# Patient Record
Sex: MALE | Employment: OTHER | URBAN - METROPOLITAN AREA
[De-identification: names, ages, dates, MRNs, and addresses within clinical notes are randomized per-mention and may not be internally consistent; named-entity substitution may affect disease eponyms.]

---

## 2022-02-21 ENCOUNTER — TELEPHONE ENCOUNTER (OUTPATIENT)
Dept: URBAN - METROPOLITAN AREA CLINIC 9 | Facility: CLINIC | Age: 72
End: 2022-02-21

## 2022-02-21 ENCOUNTER — OFFICE VISIT (OUTPATIENT)
Dept: URBAN - METROPOLITAN AREA CLINIC 7 | Facility: CLINIC | Age: 72
End: 2022-02-21

## 2022-02-23 ENCOUNTER — OFFICE VISIT (OUTPATIENT)
Dept: URBAN - METROPOLITAN AREA SURGERY CENTER 5 | Facility: SURGERY CENTER | Age: 72
End: 2022-02-23

## 2022-02-23 ENCOUNTER — TELEPHONE ENCOUNTER (OUTPATIENT)
Dept: URBAN - METROPOLITAN AREA CLINIC 9 | Facility: CLINIC | Age: 72
End: 2022-02-23

## 2022-03-02 ENCOUNTER — OFFICE VISIT (OUTPATIENT)
Dept: URBAN - METROPOLITAN AREA CLINIC 7 | Facility: CLINIC | Age: 72
End: 2022-03-02

## 2022-03-09 ENCOUNTER — OFFICE VISIT (OUTPATIENT)
Dept: URBAN - METROPOLITAN AREA CLINIC 7 | Facility: CLINIC | Age: 72
End: 2022-03-09

## 2022-04-18 ENCOUNTER — TELEPHONE ENCOUNTER (OUTPATIENT)
Dept: URBAN - METROPOLITAN AREA CLINIC 9 | Facility: CLINIC | Age: 72
End: 2022-04-18

## 2022-06-17 ENCOUNTER — TELEPHONE ENCOUNTER (OUTPATIENT)
Dept: URBAN - METROPOLITAN AREA CLINIC 9 | Facility: CLINIC | Age: 72
End: 2022-06-17

## 2022-06-27 ENCOUNTER — OFFICE VISIT (OUTPATIENT)
Dept: URBAN - METROPOLITAN AREA SURGERY CENTER 5 | Facility: SURGERY CENTER | Age: 72
End: 2022-06-27

## 2022-06-27 ENCOUNTER — TELEPHONE ENCOUNTER (OUTPATIENT)
Dept: URBAN - METROPOLITAN AREA CLINIC 9 | Facility: CLINIC | Age: 72
End: 2022-06-27

## 2022-07-30 ENCOUNTER — TELEPHONE ENCOUNTER (OUTPATIENT)
Age: 72
End: 2022-07-30

## 2022-07-31 ENCOUNTER — TELEPHONE ENCOUNTER (OUTPATIENT)
Age: 72
End: 2022-07-31

## 2022-08-18 ENCOUNTER — WEB ENCOUNTER (OUTPATIENT)
Dept: URBAN - METROPOLITAN AREA CLINIC 7 | Facility: CLINIC | Age: 72
End: 2022-08-18

## 2022-08-18 ENCOUNTER — OFFICE VISIT (OUTPATIENT)
Dept: URBAN - METROPOLITAN AREA CLINIC 7 | Facility: CLINIC | Age: 72
End: 2022-08-18
Payer: MEDICARE

## 2022-08-18 VITALS
WEIGHT: 133 LBS | BODY MASS INDEX: 19.04 KG/M2 | HEIGHT: 70 IN | DIASTOLIC BLOOD PRESSURE: 70 MMHG | SYSTOLIC BLOOD PRESSURE: 120 MMHG | TEMPERATURE: 98 F

## 2022-08-18 DIAGNOSIS — K22.70 BARRETT'S ESOPHAGUS WITHOUT DYSPLASIA: ICD-10-CM

## 2022-08-18 DIAGNOSIS — R13.12 OROPHARYNGEAL DYSPHAGIA: ICD-10-CM

## 2022-08-18 PROBLEM — 71457002: Status: ACTIVE | Noted: 2022-08-17

## 2022-08-18 PROCEDURE — 99214 OFFICE O/P EST MOD 30 MIN: CPT | Performed by: INTERNAL MEDICINE

## 2022-08-18 RX ORDER — UMECLIDINIUM BROMIDE AND VILANTEROL TRIFENATATE 62.5; 25 UG/1; UG/1
INHALE 1 PUFF BY MOUTH ONCE DAILY POWDER RESPIRATORY (INHALATION)
Qty: 60 EACH | Refills: 0 | Status: ACTIVE | COMMUNITY

## 2022-08-18 RX ORDER — AMOXICILLIN 500 MG/1
TAKE 1 TABLET BY MOUTH THREE TIMES DAILY UNTIL GONE TABLET, FILM COATED ORAL
Qty: 21 EACH | Refills: 0 | Status: ON HOLD | COMMUNITY

## 2022-08-18 RX ORDER — IBUPROFEN 800 MG/1
TAKE 1 TABLET BY MOUTH EVERY 8 HOURS AS NEEDED FOR PAIN MAX 3 PER DAY TABLET, FILM COATED ORAL
Qty: 15 EACH | Refills: 0 | Status: ACTIVE | COMMUNITY

## 2022-08-18 RX ORDER — LEVOTHYROXINE SODIUM 125 UG/1
TABLET ORAL
Qty: 90 TABLET | Status: ACTIVE | COMMUNITY

## 2022-08-18 NOTE — HPI-TODAY'S VISIT:
Patient was last seen in the office in February 2022 but is new to me.  He was evaluated for symptoms of trouble swallowing.  He also has a history of lung cancer as well as hepatitis C which was treated to virologic remission.  EGD was done on June 27, 2022 which demonstrated a 3 cm hiatal hernia, 2 tongues of salmon-colored mucosa with biopsies confirming intestinal metaplasia without any dysplasia and otherwise negative for eosinophilic esophagitis and H. pylori.  On his EGD, he did have scattered retained fluid and air bubbles in the middle third of the esophagus and lower third of the esophagus suggestive of possible dysmotility.  He had an aspiration study back in April which did demonstrate aspiration of thin liquids and residue and he also had a swallow study with speech pathology done in April which demonstrated severe pharyngeal phase dysphagia mainly characterized by decreased airway closure which resulted in aspiration of thin and nectar thick liquids.  He was deemed to be at continued high risk for aspiration. 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 He has been feeling better after his last EGD with dilation. Has gained some weight since last visit. No lung symptoms.

## 2023-07-04 PROBLEM — 428878000: Status: ACTIVE | Noted: 2023-07-04

## 2023-07-04 PROBLEM — 255056009: Status: ACTIVE | Noted: 2023-07-04

## 2023-07-05 ENCOUNTER — OFFICE VISIT (OUTPATIENT)
Dept: URBAN - METROPOLITAN AREA CLINIC 7 | Facility: CLINIC | Age: 73
End: 2023-07-05
Payer: MEDICARE

## 2023-07-05 VITALS
HEART RATE: 104 BPM | TEMPERATURE: 97.8 F | WEIGHT: 125 LBS | BODY MASS INDEX: 17.9 KG/M2 | SYSTOLIC BLOOD PRESSURE: 118 MMHG | DIASTOLIC BLOOD PRESSURE: 60 MMHG | HEIGHT: 70 IN

## 2023-07-05 DIAGNOSIS — K22.70 BARRETT'S ESOPHAGUS WITHOUT DYSPLASIA: ICD-10-CM

## 2023-07-05 DIAGNOSIS — Z85.118 HISTORY OF LUNG CANCER: ICD-10-CM

## 2023-07-05 DIAGNOSIS — K94.29: ICD-10-CM

## 2023-07-05 DIAGNOSIS — C76.0 HEAD AND NECK CANCER: ICD-10-CM

## 2023-07-05 DIAGNOSIS — R13.12 OROPHARYNGEAL DYSPHAGIA: ICD-10-CM

## 2023-07-05 PROBLEM — 309773000: Status: ACTIVE | Noted: 2023-07-05

## 2023-07-05 PROCEDURE — 99214 OFFICE O/P EST MOD 30 MIN: CPT | Performed by: INTERNAL MEDICINE

## 2023-07-05 RX ORDER — ALBUTEROL SULFATE 90 UG/1
2 PUFF AS NEEDED AEROSOL, METERED RESPIRATORY (INHALATION)
Status: ACTIVE | COMMUNITY

## 2023-07-05 RX ORDER — BUSPIRONE HYDROCHLORIDE 7.5 MG/1
1 TABLET TABLET ORAL TWICE A DAY
Status: ACTIVE | COMMUNITY

## 2023-07-05 RX ORDER — AMOXICILLIN AND CLAVULANATE POTASSIUM 875; 125 MG/1; MG/1
1 TABLET TABLET, FILM COATED ORAL
Qty: 20 TABLET | Refills: 0 | OUTPATIENT
Start: 2023-07-05 | End: 2023-07-15

## 2023-07-05 RX ORDER — IBUPROFEN 800 MG/1
TAKE 1 TABLET BY MOUTH EVERY 8 HOURS AS NEEDED FOR PAIN MAX 3 PER DAY TABLET, FILM COATED ORAL
Qty: 15 EACH | Refills: 0 | Status: ACTIVE | COMMUNITY

## 2023-07-05 RX ORDER — POTASSIUM CHLORIDE 1500 MG/1
1 TABLET WITH FOOD TABLET, EXTENDED RELEASE ORAL TWICE A DAY
Status: ACTIVE | COMMUNITY

## 2023-07-05 RX ORDER — OMEPRAZOLE 20 MG/1
1 CAPSULE 30 MINUTES BEFORE MORNING MEAL CAPSULE, DELAYED RELEASE ORAL ONCE A DAY
Status: ACTIVE | COMMUNITY

## 2023-07-05 RX ORDER — CLOPIDOGREL BISULFATE 75 MG/1
1 TABLET TABLET ORAL ONCE A DAY
Status: ACTIVE | COMMUNITY

## 2023-07-05 RX ORDER — UMECLIDINIUM BROMIDE AND VILANTEROL TRIFENATATE 62.5; 25 UG/1; UG/1
INHALE 1 PUFF BY MOUTH ONCE DAILY POWDER RESPIRATORY (INHALATION)
Qty: 60 EACH | Refills: 0 | Status: ACTIVE | COMMUNITY

## 2023-07-05 RX ORDER — ALPRAZOLAM 0.25 MG/1
1 TABLET TABLET ORAL AS NEEDED
Status: ACTIVE | COMMUNITY

## 2023-07-05 RX ORDER — AMOXICILLIN 500 MG/1
TAKE 1 TABLET BY MOUTH THREE TIMES DAILY UNTIL GONE TABLET, FILM COATED ORAL
Qty: 21 EACH | Refills: 0 | Status: ON HOLD | COMMUNITY

## 2023-07-05 RX ORDER — SUCRALFATE 1 G/1
1 TABLET ON AN EMPTY STOMACH TABLET ORAL
Status: ACTIVE | COMMUNITY

## 2023-07-05 RX ORDER — MIDODRINE HYDROCHLORIDE 5 MG/1
1 TABLET TABLET ORAL THREE TIMES A DAY
Status: ACTIVE | COMMUNITY

## 2023-07-05 RX ORDER — ATORVASTATIN CALCIUM 10 MG/1
1 TABLET TABLET, FILM COATED ORAL ONCE A DAY
Status: ACTIVE | COMMUNITY

## 2023-07-05 RX ORDER — APIXABAN 5 MG/1
1 TABLET TABLET, FILM COATED ORAL TWICE A DAY
Status: ACTIVE | COMMUNITY

## 2023-07-05 RX ORDER — LISINOPRIL 10 MG/1
1 TABLET TABLET ORAL ONCE A DAY
Status: ACTIVE | COMMUNITY

## 2023-07-05 RX ORDER — LEVOTHYROXINE SODIUM 125 UG/1
TABLET ORAL
Qty: 90 TABLET | Status: ACTIVE | COMMUNITY

## 2023-07-05 RX ORDER — HYDROCODONE BITARTRATE AND ACETAMINOPHEN 5; 325 MG/1; MG/1
1 TABLET AS NEEDED TABLET ORAL
Refills: 0 | Status: ACTIVE | COMMUNITY

## 2023-07-05 NOTE — PHYSICAL EXAM CARDIOVASCULAR:
no edema,  no murmurs,  regular rate and rhythm Abdomen soft, nontender, nondistended, bowel sounds present in all 4 quadrants.

## 2023-07-05 NOTE — EXAM-PHYSICAL EXAM
G-tube site exam: does have some mild erythema surrounding PEG tube insertion site with milky, yellowish drainage from the insertion site

## 2023-07-05 NOTE — HPI-TODAY'S VISIT:
LV 8/2022. Patient was last seen in the office in February 2022 but is new to me.  He was evaluated for symptoms of trouble swallowing.  He also has a history of lung cancer as well as hepatitis C which was treated to virologic remission.  EGD was done on June 27, 2022 which demonstrated a 3 cm hiatal hernia, 2 tongues of salmon-colored mucosa with biopsies confirming intestinal metaplasia without any dysplasia and otherwise negative for eosinophilic esophagitis and H. pylori.  On his EGD, he did have scattered retained fluid and air bubbles in the middle third of the esophagus and lower third of the esophagus suggestive of possible dysmotility.  He had an aspiration study back in April which did demonstrate aspiration of thin liquids and residue and he also had a swallow study with speech pathology done in April which demonstrated severe pharyngeal phase dysphagia mainly characterized by decreased airway closure which resulted in aspiration of thin and nectar thick liquids.  He was deemed to be at continued high risk for aspiration. 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 He has been feeling better after his last EGD with dilation. Has gained some weight since last visit. No lung symptoms. Swallowing difficulties are largely oropharyngeal in nature, as evidenced by recent swallow evaluation in 4/2022, and he requires dysphagia therapy. He understands that he continues to be high risk for aspiration and lung disease and pneumonia, and possible even death as a result. Have placed him on PPI for his Staples's and plan was to repeat EGD as needed, and in 3 years for surveillance. Advised taking small bites, chew thoroughly before swallowing, take your time when eating, and follow solid swallows with a swallow of a small volume of fluid. FU now.  Since last visit, patient did experience a intrajugular DVT back in late 2022 and has had progressive oropharyngeal discomfort and dysphagia with worsening fatigue.  Weight in May was 131.  Recent laboratory values in May 2023 demonstrated hemoglobin of 8.7, platelets 189, creatinine 1.0, normal LFTs.  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  The plan for his treatment was proceed with definitive chemoradiotherapy.  On Eliquis for his DVT of the internal jugular which was thought to be provoked by his new diagnosis of cancer in the neck.  He also did have a stent placement to his coronary arteries in September 2022 and had been on Plavix and aspirin.  ChemoXRT stopped at the beginning of May 2023. He had eval at ENT and mass lesion is not seen any further. He is hoarse. Had Gtube placed by IR on 6/27/23. Did have aspiration on X-ray at the start of June 2023. He continues to eat via PO intake. Having pain at site of insertion, continuous, and wants tube out. Lost 8 lbs since last visit, so not a ton. Has not been using tube for feeds. Has flushed the tube. Had some milky fluid inside.

## 2023-07-06 ENCOUNTER — TELEPHONE ENCOUNTER (OUTPATIENT)
Dept: URBAN - METROPOLITAN AREA CLINIC 7 | Facility: CLINIC | Age: 73
End: 2023-07-06

## 2023-09-27 ENCOUNTER — LAB OUTSIDE AN ENCOUNTER (OUTPATIENT)
Dept: URBAN - METROPOLITAN AREA CLINIC 7 | Facility: CLINIC | Age: 73
End: 2023-09-27

## 2023-09-27 ENCOUNTER — OFFICE VISIT (OUTPATIENT)
Dept: URBAN - METROPOLITAN AREA CLINIC 7 | Facility: CLINIC | Age: 73
End: 2023-09-27
Payer: MEDICARE

## 2023-09-27 ENCOUNTER — TELEPHONE ENCOUNTER (OUTPATIENT)
Dept: URBAN - METROPOLITAN AREA CLINIC 8 | Facility: CLINIC | Age: 73
End: 2023-09-27

## 2023-09-27 VITALS
TEMPERATURE: 97.6 F | BODY MASS INDEX: 16.32 KG/M2 | WEIGHT: 114 LBS | HEIGHT: 70 IN | RESPIRATION RATE: 16 BRPM | SYSTOLIC BLOOD PRESSURE: 110 MMHG | DIASTOLIC BLOOD PRESSURE: 60 MMHG

## 2023-09-27 DIAGNOSIS — K22.70 BARRETT'S ESOPHAGUS WITHOUT DYSPLASIA: ICD-10-CM

## 2023-09-27 DIAGNOSIS — R13.12 OROPHARYNGEAL DYSPHAGIA: ICD-10-CM

## 2023-09-27 DIAGNOSIS — K94.29: ICD-10-CM

## 2023-09-27 DIAGNOSIS — C76.0 HEAD AND NECK CANCER: ICD-10-CM

## 2023-09-27 DIAGNOSIS — R10.13 EPIGASTRIC ABDOMINAL PAIN: ICD-10-CM

## 2023-09-27 DIAGNOSIS — R63.4 WEIGHT LOSS: ICD-10-CM

## 2023-09-27 DIAGNOSIS — Z85.118 HISTORY OF LUNG CANCER: ICD-10-CM

## 2023-09-27 PROCEDURE — 99214 OFFICE O/P EST MOD 30 MIN: CPT | Performed by: INTERNAL MEDICINE

## 2023-09-27 RX ORDER — CLOPIDOGREL BISULFATE 75 MG/1
1 TABLET TABLET ORAL ONCE A DAY
Status: ACTIVE | COMMUNITY

## 2023-09-27 RX ORDER — ALPRAZOLAM 0.25 MG/1
1 TABLET TABLET ORAL AS NEEDED
Status: ACTIVE | COMMUNITY

## 2023-09-27 RX ORDER — LISINOPRIL 10 MG/1
1 TABLET TABLET ORAL ONCE A DAY
Status: ACTIVE | COMMUNITY

## 2023-09-27 RX ORDER — MIDODRINE HYDROCHLORIDE 5 MG/1
1 TABLET TABLET ORAL THREE TIMES A DAY
Status: ACTIVE | COMMUNITY

## 2023-09-27 RX ORDER — ATORVASTATIN CALCIUM 10 MG/1
1 TABLET TABLET, FILM COATED ORAL ONCE A DAY
Status: ACTIVE | COMMUNITY

## 2023-09-27 RX ORDER — OMEPRAZOLE 20 MG/1
1 CAPSULE 30 MINUTES BEFORE MORNING MEAL CAPSULE, DELAYED RELEASE ORAL ONCE A DAY
Status: ACTIVE | COMMUNITY

## 2023-09-27 RX ORDER — BUSPIRONE HYDROCHLORIDE 7.5 MG/1
1 TABLET TABLET ORAL TWICE A DAY
Status: ACTIVE | COMMUNITY

## 2023-09-27 RX ORDER — APIXABAN 5 MG/1
1 TABLET TABLET, FILM COATED ORAL TWICE A DAY
Status: ACTIVE | COMMUNITY

## 2023-09-27 RX ORDER — SUCRALFATE 1 G/1
1 TABLET ON AN EMPTY STOMACH TABLET ORAL
Status: DISCONTINUED | COMMUNITY

## 2023-09-27 RX ORDER — ALBUTEROL SULFATE 90 UG/1
2 PUFF AS NEEDED AEROSOL, METERED RESPIRATORY (INHALATION)
Status: ACTIVE | COMMUNITY

## 2023-09-27 RX ORDER — HYDROCODONE BITARTRATE AND ACETAMINOPHEN 5; 325 MG/1; MG/1
1 TABLET AS NEEDED TABLET ORAL
Refills: 0 | Status: ACTIVE | COMMUNITY

## 2023-09-27 RX ORDER — POTASSIUM CHLORIDE 1500 MG/1
1 TABLET WITH FOOD TABLET, EXTENDED RELEASE ORAL TWICE A DAY
Status: DISCONTINUED | COMMUNITY

## 2023-09-27 RX ORDER — IBUPROFEN 800 MG/1
TAKE 1 TABLET BY MOUTH EVERY 8 HOURS AS NEEDED FOR PAIN MAX 3 PER DAY TABLET, FILM COATED ORAL
Qty: 15 EACH | Refills: 0 | Status: DISCONTINUED | COMMUNITY

## 2023-09-27 RX ORDER — UMECLIDINIUM BROMIDE AND VILANTEROL TRIFENATATE 62.5; 25 UG/1; UG/1
INHALE 1 PUFF BY MOUTH ONCE DAILY POWDER RESPIRATORY (INHALATION)
Qty: 60 EACH | Refills: 0 | Status: ACTIVE | COMMUNITY

## 2023-09-27 RX ORDER — AMOXICILLIN 500 MG/1
TAKE 1 TABLET BY MOUTH THREE TIMES DAILY UNTIL GONE TABLET, FILM COATED ORAL
Qty: 21 EACH | Refills: 0 | Status: ON HOLD | COMMUNITY

## 2023-09-27 RX ORDER — LEVOTHYROXINE SODIUM 125 UG/1
TABLET ORAL
Qty: 90 TABLET | Status: ACTIVE | COMMUNITY

## 2023-09-27 NOTE — HPI-TODAY'S VISIT:
LV 7/2023. He was evaluated for symptoms of trouble swallowing.  He also has a history of lung cancer as well as hepatitis C which was treated to virologic remission.  EGD was done on June 27, 2022 which demonstrated a 3 cm hiatal hernia, 2 tongues of salmon-colored mucosa with biopsies confirming intestinal metaplasia without any dysplasia and otherwise negative for eosinophilic esophagitis and H. pylori.  On his EGD, he did have scattered retained fluid and air bubbles in the middle third of the esophagus and lower third of the esophagus suggestive of possible dysmotility.  He had an aspiration study which did demonstrate aspiration of thin liquids and residue and he also had a swallow study with speech pathology done in April 2022 which demonstrated severe pharyngeal phase dysphagia mainly characterized by decreased airway closure which resulted in aspiration of thin and nectar thick liquids.  He was deemed to be at continued high risk for aspiration. 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 He has been feeling better after his last EGD with dilation. Had gained some weight since last visit. No lung symptoms. Swallowing difficulties largely oropharyngeal in nature, as evidenced by recent swallow evaluation in 4/2022, and he requires dysphagia therapy. He understands that he continues to be high risk for aspiration and lung disease and pneumonia, and possible even death as a result. Placed him on PPI for his Staples's and plan was to repeat EGD as needed, and in 3 years for surveillance. He did experience a intrajugular DVT back in late 2022 and has had progressive oropharyngeal discomfort and dysphagia with worsening fatigue.  Weight in May was 131. Lab values in May 2023 demonstrated hemoglobin of 8.7, platelets 189, creatinine 1.0, normal LFTs.  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  The plan for his treatment was proceed with definitive chemoradiotherapy.  On Eliquis for his DVT of the internal jugular which was thought to be provoked by his new diagnosis of cancer in the neck.  He also did have a stent placement to his coronary arteries in September 2022 and had been on Plavix and aspirin. ChemoXRT stopped at the beginning of May 2023. He had eval at ENT and mass lesion is not seen any further. He was hoarse. Had Gtube placed by IR on 6/27/23. Did have aspiration on X-ray at the start of June 2023. He continued to eat via PO intake despite aspiration risk. LV was having pain at site of Gtube insertion, continuous, and wanted tube out. Has lost 8 lbs since last visit, so not a ton. Has not been using tube for feeds. Has flushed the tube. Had some milky fluid inside. LV in July 2023 he was seen for gastrostomy complication (unrelenting pain at g-tube insertion site, which was placed on 6/27/23) with erythema at insertion site and drainage of possible purulent fluid. Risks of removal at this time (earlier than 4 weeks and possible immature tract) were discussed with patient and decision was made to proceed. Balloon was deflated and tube was removed without issue. Stoma was draped with gauze and patient did well. Will treat with antibiotics for 10 days (Augmentin) in case of any peritoneal spill and did advise that if he develops severe, persistent abdomen pain, that he proceed to ER given concern for peritonitis in that situation. He did well post-G tube removal. Still following with [facility] with plan PET/CT and Hgb in 7/2023 was 11.4. FU now.  He is eating PO, solids, sometimes coughs, but no clear choking. He is getting post-prandial abd discomfort, sometimes positional, when bending down. No vomiting. He has lost 9 lbs since last visit. Just not eating enough. It feels like a muscle spasm at times. He remains on Plavix, still on Eliquis.

## 2023-11-06 ENCOUNTER — TELEPHONE ENCOUNTER (OUTPATIENT)
Dept: URBAN - METROPOLITAN AREA SURGERY CENTER 5 | Facility: SURGERY CENTER | Age: 73
End: 2023-11-06

## 2023-11-07 ENCOUNTER — OFFICE VISIT (OUTPATIENT)
Dept: URBAN - METROPOLITAN AREA SURGERY CENTER 5 | Facility: SURGERY CENTER | Age: 73
End: 2023-11-07

## 2023-11-14 ENCOUNTER — CLAIMS CREATED FROM THE CLAIM WINDOW (OUTPATIENT)
Dept: URBAN - METROPOLITAN AREA CLINIC 4 | Facility: CLINIC | Age: 73
End: 2023-11-14
Payer: MEDICARE

## 2023-11-14 ENCOUNTER — TELEPHONE ENCOUNTER (OUTPATIENT)
Dept: URBAN - METROPOLITAN AREA CLINIC 7 | Facility: CLINIC | Age: 73
End: 2023-11-14

## 2023-11-14 ENCOUNTER — OFFICE VISIT (OUTPATIENT)
Dept: URBAN - METROPOLITAN AREA SURGERY CENTER 5 | Facility: SURGERY CENTER | Age: 73
End: 2023-11-14
Payer: MEDICARE

## 2023-11-14 DIAGNOSIS — K44.9 DIAPHRAGMATIC HERNIA WITHOUT OBSTRUCTION OR GANGRENE: ICD-10-CM

## 2023-11-14 DIAGNOSIS — K22.89 OTHER SPECIFIED DISEASE OF ESOPHAGUS: ICD-10-CM

## 2023-11-14 DIAGNOSIS — K31.89 OTHER DISEASES OF STOMACH AND DUODENUM: ICD-10-CM

## 2023-11-14 DIAGNOSIS — K29.70 GASTRITIS, UNSPECIFIED, WITHOUT BLEEDING: ICD-10-CM

## 2023-11-14 PROCEDURE — 88305 TISSUE EXAM BY PATHOLOGIST: CPT | Performed by: PATHOLOGY

## 2023-11-14 PROCEDURE — 43239 EGD BIOPSY SINGLE/MULTIPLE: CPT | Performed by: INTERNAL MEDICINE

## 2023-11-14 PROCEDURE — 43248 EGD GUIDE WIRE INSERTION: CPT | Performed by: INTERNAL MEDICINE

## 2023-11-14 PROCEDURE — 00731 ANES UPR GI NDSC PX NOS: CPT | Performed by: NURSE ANESTHETIST, CERTIFIED REGISTERED

## 2023-11-14 PROCEDURE — 88342 IMHCHEM/IMCYTCHM 1ST ANTB: CPT | Performed by: PATHOLOGY

## 2023-11-14 PROCEDURE — 88313 SPECIAL STAINS GROUP 2: CPT | Performed by: PATHOLOGY

## 2023-11-14 RX ORDER — HYDROCODONE BITARTRATE AND ACETAMINOPHEN 5; 325 MG/1; MG/1
1 TABLET AS NEEDED TABLET ORAL
Refills: 0 | Status: ACTIVE | COMMUNITY

## 2023-11-14 RX ORDER — PANTOPRAZOLE SODIUM 40 MG/1
1 TABLET TABLET, DELAYED RELEASE ORAL ONCE A DAY
Qty: 30 | Refills: 3 | OUTPATIENT
Start: 2023-11-14

## 2023-11-14 RX ORDER — CLOPIDOGREL BISULFATE 75 MG/1
1 TABLET TABLET ORAL ONCE A DAY
Status: ACTIVE | COMMUNITY

## 2023-11-14 RX ORDER — BUSPIRONE HYDROCHLORIDE 7.5 MG/1
1 TABLET TABLET ORAL TWICE A DAY
Status: ACTIVE | COMMUNITY

## 2023-11-14 RX ORDER — LEVOTHYROXINE SODIUM 125 UG/1
TABLET ORAL
Qty: 90 TABLET | Status: ACTIVE | COMMUNITY

## 2023-11-14 RX ORDER — ATORVASTATIN CALCIUM 10 MG/1
1 TABLET TABLET, FILM COATED ORAL ONCE A DAY
Status: ACTIVE | COMMUNITY

## 2023-11-14 RX ORDER — ALPRAZOLAM 0.25 MG/1
1 TABLET TABLET ORAL AS NEEDED
Status: ACTIVE | COMMUNITY

## 2023-11-14 RX ORDER — UMECLIDINIUM BROMIDE AND VILANTEROL TRIFENATATE 62.5; 25 UG/1; UG/1
INHALE 1 PUFF BY MOUTH ONCE DAILY POWDER RESPIRATORY (INHALATION)
Qty: 60 EACH | Refills: 0 | Status: ACTIVE | COMMUNITY

## 2023-11-14 RX ORDER — MIDODRINE HYDROCHLORIDE 5 MG/1
1 TABLET TABLET ORAL THREE TIMES A DAY
Status: ACTIVE | COMMUNITY

## 2023-11-14 RX ORDER — ALBUTEROL SULFATE 90 UG/1
2 PUFF AS NEEDED AEROSOL, METERED RESPIRATORY (INHALATION)
Status: ACTIVE | COMMUNITY

## 2023-11-14 RX ORDER — AMOXICILLIN 500 MG/1
TAKE 1 TABLET BY MOUTH THREE TIMES DAILY UNTIL GONE TABLET, FILM COATED ORAL
Qty: 21 EACH | Refills: 0 | Status: ON HOLD | COMMUNITY

## 2023-11-14 RX ORDER — APIXABAN 5 MG/1
1 TABLET TABLET, FILM COATED ORAL TWICE A DAY
Status: ACTIVE | COMMUNITY

## 2023-11-14 RX ORDER — LISINOPRIL 10 MG/1
1 TABLET TABLET ORAL ONCE A DAY
Status: ACTIVE | COMMUNITY

## 2023-11-14 RX ORDER — OMEPRAZOLE 20 MG/1
1 CAPSULE 30 MINUTES BEFORE MORNING MEAL CAPSULE, DELAYED RELEASE ORAL ONCE A DAY
Status: ACTIVE | COMMUNITY

## 2023-11-21 ENCOUNTER — OFFICE VISIT (OUTPATIENT)
Dept: URBAN - METROPOLITAN AREA CLINIC 7 | Facility: CLINIC | Age: 73
End: 2023-11-21
Payer: MEDICARE

## 2023-11-21 ENCOUNTER — DASHBOARD ENCOUNTERS (OUTPATIENT)
Age: 73
End: 2023-11-21

## 2023-11-21 VITALS
TEMPERATURE: 97.8 F | DIASTOLIC BLOOD PRESSURE: 80 MMHG | SYSTOLIC BLOOD PRESSURE: 110 MMHG | WEIGHT: 113 LBS | HEIGHT: 70 IN | BODY MASS INDEX: 16.18 KG/M2 | RESPIRATION RATE: 16 BRPM

## 2023-11-21 DIAGNOSIS — R13.12 OROPHARYNGEAL DYSPHAGIA: ICD-10-CM

## 2023-11-21 DIAGNOSIS — K22.70 BARRETT'S ESOPHAGUS WITHOUT DYSPLASIA: ICD-10-CM

## 2023-11-21 DIAGNOSIS — R63.4 WEIGHT LOSS: ICD-10-CM

## 2023-11-21 DIAGNOSIS — Z85.118 HISTORY OF LUNG CANCER: ICD-10-CM

## 2023-11-21 DIAGNOSIS — C76.0 HEAD AND NECK CANCER: ICD-10-CM

## 2023-11-21 DIAGNOSIS — R10.13 EPIGASTRIC ABDOMINAL PAIN: ICD-10-CM

## 2023-11-21 DIAGNOSIS — K94.29: ICD-10-CM

## 2023-11-21 PROCEDURE — 99214 OFFICE O/P EST MOD 30 MIN: CPT | Performed by: INTERNAL MEDICINE

## 2023-11-21 RX ORDER — OMEPRAZOLE 20 MG/1
1 CAPSULE 30 MINUTES BEFORE MORNING MEAL CAPSULE, DELAYED RELEASE ORAL ONCE A DAY
Status: ACTIVE | COMMUNITY

## 2023-11-21 RX ORDER — PANTOPRAZOLE SODIUM 40 MG/1
1 TABLET TABLET, DELAYED RELEASE ORAL ONCE A DAY
Qty: 90 | Refills: 3
Start: 2023-11-14

## 2023-11-21 RX ORDER — AMOXICILLIN 500 MG/1
TAKE 1 TABLET BY MOUTH THREE TIMES DAILY UNTIL GONE TABLET, FILM COATED ORAL
Qty: 21 EACH | Refills: 0 | Status: ON HOLD | COMMUNITY

## 2023-11-21 RX ORDER — CLOPIDOGREL BISULFATE 75 MG/1
1 TABLET TABLET ORAL ONCE A DAY
Status: DISCONTINUED | COMMUNITY

## 2023-11-21 RX ORDER — ATORVASTATIN CALCIUM 10 MG/1
1 TABLET TABLET, FILM COATED ORAL ONCE A DAY
Status: ACTIVE | COMMUNITY

## 2023-11-21 RX ORDER — UMECLIDINIUM BROMIDE AND VILANTEROL TRIFENATATE 62.5; 25 UG/1; UG/1
INHALE 1 PUFF BY MOUTH ONCE DAILY POWDER RESPIRATORY (INHALATION)
Qty: 60 EACH | Refills: 0 | Status: ACTIVE | COMMUNITY

## 2023-11-21 RX ORDER — HYDROCODONE BITARTRATE AND ACETAMINOPHEN 5; 325 MG/1; MG/1
1 TABLET AS NEEDED TABLET ORAL
Refills: 0 | Status: ACTIVE | COMMUNITY

## 2023-11-21 RX ORDER — ALBUTEROL SULFATE 90 UG/1
2 PUFF AS NEEDED AEROSOL, METERED RESPIRATORY (INHALATION)
Status: ACTIVE | COMMUNITY

## 2023-11-21 RX ORDER — BUSPIRONE HYDROCHLORIDE 7.5 MG/1
1 TABLET TABLET ORAL TWICE A DAY
Status: ACTIVE | COMMUNITY

## 2023-11-21 RX ORDER — ALPRAZOLAM 0.25 MG/1
1 TABLET TABLET ORAL AS NEEDED
Status: ACTIVE | COMMUNITY

## 2023-11-21 RX ORDER — APIXABAN 5 MG/1
1 TABLET TABLET, FILM COATED ORAL TWICE A DAY
Status: DISCONTINUED | COMMUNITY

## 2023-11-21 RX ORDER — LEVOTHYROXINE SODIUM 125 UG/1
TABLET ORAL
Qty: 90 TABLET | Status: ACTIVE | COMMUNITY

## 2023-11-21 RX ORDER — LISINOPRIL 10 MG/1
1 TABLET TABLET ORAL ONCE A DAY
Status: ACTIVE | COMMUNITY

## 2023-11-21 RX ORDER — MIDODRINE HYDROCHLORIDE 5 MG/1
1 TABLET TABLET ORAL THREE TIMES A DAY
Status: ACTIVE | COMMUNITY

## 2023-11-21 NOTE — HPI-TODAY'S VISIT:
LV 7/2023. He was evaluated for symptoms of trouble swallowing.  He also has a history of lung cancer as well as hepatitis C which was treated to virologic remission.  EGD was done on June 27, 2022 which demonstrated a 3 cm hiatal hernia, 2 tongues of salmon-colored mucosa with biopsies confirming intestinal metaplasia without any dysplasia and otherwise negative for eosinophilic esophagitis and H. pylori.  On his EGD, he did have scattered retained fluid and air bubbles in the middle third of the esophagus and lower third of the esophagus suggestive of possible dysmotility.  He had an aspiration study which did demonstrate aspiration of thin liquids and residue and he also had a swallow study with speech pathology done in April 2022 which demonstrated severe pharyngeal phase dysphagia mainly characterized by decreased airway closure which resulted in aspiration of thin and nectar thick liquids.  He was deemed to be at continued high risk for aspiration. 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 He has been feeling better after his last EGD with dilation. Had gained some weight since last visit. No lung symptoms. Swallowing difficulties largely oropharyngeal in nature, as evidenced by recent swallow evaluation in 4/2022, and he requires dysphagia therapy. He understands that he continues to be high risk for aspiration and lung disease and pneumonia, and possible even death as a result. Placed him on PPI for his Staples's and plan was to repeat EGD as needed, and in 3 years for surveillance. He did experience a intrajugular DVT back in late 2022 and has had progressive oropharyngeal discomfort and dysphagia with worsening fatigue.  Weight in May was 131. Lab values in May 2023 demonstrated hemoglobin of 8.7, platelets 189, creatinine 1.0, normal LFTs.  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  The plan for his treatment was proceed with definitive chemoradiotherapy.  On Eliquis for his DVT of the internal jugular which was thought to be provoked by his new diagnosis of cancer in the neck.  He also did have a stent placement to his coronary arteries in September 2022 and had been on Plavix and aspirin. ChemoXRT stopped at the beginning of May 2023. He had eval at ENT and mass lesion is not seen any further. He was hoarse. Had Gtube placed by IR on 6/27/23. Did have aspiration on X-ray at the start of June 2023. He continued to eat via PO intake despite aspiration risk. LV was having pain at site of Gtube insertion, continuous, and wanted tube out. Has lost 8 lbs since last visit, so not a ton. Has not been using tube for feeds. Has flushed the tube. Had some milky fluid inside. LV in July 2023 he was seen for gastrostomy complication (unrelenting pain at g-tube insertion site, which was placed on 6/27/23) with erythema at insertion site and drainage of possible purulent fluid. Risks of removal at this time (earlier than 4 weeks and possible immature tract) were discussed with patient and decision was made to proceed. Balloon was deflated and tube was removed without issue. Stoma was draped with gauze and patient did well. Will treat with antibiotics for 10 days (Augmentin) in case of any peritoneal spill and did advise that if he develops severe, persistent abdomen pain, that he proceed to ER given concern for peritonitis in that situation. He did well post-G tube removal. Was still following with [facility] with plan PET/CT and Hgb in 7/2023 was 11.4. LV weight 114. He was eating PO, solids, sometimes coughs, but no clear choking/aspiration. He is getting post-prandial abd discomfort, sometimes positional, when bending down. No vomiting. He has lost 9 lbs since last visit. Just not eating enough. It feels like a muscle spasm at times. He remains on Plavix, still on Eliquis. Plan last visit was to get his last PET CT scan from [facility] which she had done at the end of July 2022, but overall did not seem that he was getting enough calories some of which may be secondary to swallow issues but some of which also due to his treatment effects.  Plan was also to pursue an upper endoscopy with dilation and cardiac clearance.  EGD November 2023 demonstrated a small 2 cm sliding hiatal hernia, 2 tongues of salmon-colored mucosa consistent with Staples's esophagus, otherwise normal esophagus with dilation performed to 51 French, healthy-appearing post gastrostomy scar in the gastric body, erythema in the gastric body, normal duodenum.  He was placed back on PPI for his Staples's esophagus given that he was not taking this and to repeat an upper endoscopy for dilation as needed.  Pathology has not yet returned.  Echocardiogram from April 2022 demonstrated an EF of 45 to 50%.  PET CT scan demonstrated that the previously seen asymmetric mass involving the left hypopharynx had resolved, multiple parenchymal opacities in the right and left lungs which could be infectious or inflammatory, no FDG uptake in the bowel, no FDG avid retroperitoneal mesenteric lymphadenopathy.  Close follow-up was recommended as metastatic disease could not be ruled out. Placed back on PPI. FU now. He is here to follow up his scope. Off Plavix, he is aspirin, off Eliquis. Seeing pulm doctor 12/5/23. Weight is stable since last visit. He does follow with [facility]. Drinking Boost shakes daily.

## 2024-09-20 ENCOUNTER — WEB ENCOUNTER (OUTPATIENT)
Dept: URBAN - METROPOLITAN AREA CLINIC 7 | Facility: CLINIC | Age: 74
End: 2024-09-20

## 2024-10-18 ENCOUNTER — LAB OUTSIDE AN ENCOUNTER (OUTPATIENT)
Dept: URBAN - METROPOLITAN AREA CLINIC 7 | Facility: CLINIC | Age: 74
End: 2024-10-18

## 2024-10-18 ENCOUNTER — TELEPHONE ENCOUNTER (OUTPATIENT)
Dept: URBAN - METROPOLITAN AREA CLINIC 7 | Facility: CLINIC | Age: 74
End: 2024-10-18

## 2024-10-18 ENCOUNTER — OFFICE VISIT (OUTPATIENT)
Dept: URBAN - METROPOLITAN AREA CLINIC 7 | Facility: CLINIC | Age: 74
End: 2024-10-18
Payer: MEDICARE

## 2024-10-18 VITALS
WEIGHT: 107 LBS | TEMPERATURE: 97.6 F | HEIGHT: 70 IN | RESPIRATION RATE: 97 BRPM | DIASTOLIC BLOOD PRESSURE: 76 MMHG | OXYGEN SATURATION: 97 % | SYSTOLIC BLOOD PRESSURE: 118 MMHG | BODY MASS INDEX: 15.32 KG/M2

## 2024-10-18 DIAGNOSIS — K22.70 BARRETT'S ESOPHAGUS WITHOUT DYSPLASIA: ICD-10-CM

## 2024-10-18 DIAGNOSIS — R10.13 EPIGASTRIC ABDOMINAL PAIN: ICD-10-CM

## 2024-10-18 DIAGNOSIS — R63.4 WEIGHT LOSS: ICD-10-CM

## 2024-10-18 DIAGNOSIS — R13.12 OROPHARYNGEAL DYSPHAGIA: ICD-10-CM

## 2024-10-18 DIAGNOSIS — C76.0 HEAD AND NECK CANCER: ICD-10-CM

## 2024-10-18 DIAGNOSIS — K94.29: ICD-10-CM

## 2024-10-18 DIAGNOSIS — R10.33 PERIUMBILICAL ABDOMINAL PAIN: ICD-10-CM

## 2024-10-18 DIAGNOSIS — Z85.118 HISTORY OF LUNG CANCER: ICD-10-CM

## 2024-10-18 PROCEDURE — 99214 OFFICE O/P EST MOD 30 MIN: CPT | Performed by: INTERNAL MEDICINE

## 2024-10-18 RX ORDER — PANTOPRAZOLE SODIUM 40 MG/1
1 TABLET TABLET, DELAYED RELEASE ORAL ONCE A DAY
Qty: 90 | Refills: 3 | Status: ACTIVE | COMMUNITY

## 2024-10-18 RX ORDER — APIXABAN 5 MG/1
TABLET, FILM COATED ORAL
Qty: 60 TABLET | Status: ON HOLD | COMMUNITY

## 2024-10-18 RX ORDER — UMECLIDINIUM BROMIDE AND VILANTEROL TRIFENATATE 62.5; 25 UG/1; UG/1
INHALE 1 PUFF BY MOUTH ONCE DAILY POWDER RESPIRATORY (INHALATION)
Qty: 60 EACH | Refills: 0 | Status: ACTIVE | COMMUNITY

## 2024-10-18 RX ORDER — HYDROXYZINE HYDROCHLORIDE 25 MG/1
1 TABLET AS NEEDED TABLET, FILM COATED ORAL ONCE A DAY
Status: ACTIVE | COMMUNITY

## 2024-10-18 RX ORDER — HYDROCODONE BITARTRATE AND ACETAMINOPHEN 7.5; 325 MG/1; MG/1
1 TABLET AS NEEDED TABLET ORAL
Refills: 0 | Status: ACTIVE | COMMUNITY

## 2024-10-18 RX ORDER — MUPIROCIN 20 MG/G
1 APPLICATION OINTMENT TOPICAL TWICE A DAY
Status: ACTIVE | COMMUNITY

## 2024-10-18 RX ORDER — FAMOTIDINE 40 MG/1
1 TABLET TABLET, FILM COATED ORAL
Qty: 60 TABLET | Refills: 3 | OUTPATIENT
Start: 2024-10-18

## 2024-10-18 RX ORDER — BENZONATATE 100 MG/1
1 CAPSULE AS NEEDED CAPSULE ORAL THREE TIMES A DAY
Status: ACTIVE | COMMUNITY

## 2024-10-18 RX ORDER — METOPROLOL SUCCINATE 25 MG/1
TABLET, EXTENDED RELEASE ORAL
Qty: 90 TABLET | Status: ACTIVE | COMMUNITY

## 2024-10-18 RX ORDER — PREDNISONE 10 MG/1
TAKE 4 TABS PO X 1 WEEK, TAKE 3 TABS PO X 1 WEEK, TAKE 2 TABS PO X 1 WEEK, TAKE 1 TAB PO X 1 WEEK TABLET ORAL ONCE A DAY
Status: ACTIVE | COMMUNITY

## 2024-10-18 RX ORDER — ASPIRIN 81 MG/1
1 TABLET TABLET, CHEWABLE ORAL ONCE A DAY
Status: ACTIVE | COMMUNITY

## 2024-10-18 RX ORDER — CLOPIDOGREL BISULFATE 75 MG/1
TAKE 1 TABLET BY MOUTH ONCE DAILY TABLET, FILM COATED ORAL
Qty: 90 EACH | Refills: 2 | Status: ON HOLD | COMMUNITY

## 2024-10-18 RX ORDER — DOXYCYCLINE HYCLATE 100 MG/1
1 CAPSULE CAPSULE ORAL ONCE A DAY
Status: ACTIVE | COMMUNITY

## 2024-10-18 RX ORDER — OMEPRAZOLE 40 MG/1
1 CAPSULE 30 MINUTES BEFORE MEAL CAPSULE, DELAYED RELEASE ORAL TWICE DAILY
Qty: 60 | Refills: 3 | OUTPATIENT
Start: 2024-10-18

## 2024-10-18 RX ORDER — LISINOPRIL 2.5 MG/1
1 TABLET TABLET ORAL ONCE A DAY
Status: ACTIVE | COMMUNITY

## 2024-10-18 RX ORDER — AMOXICILLIN 500 MG/1
TAKE 1 TABLET BY MOUTH THREE TIMES DAILY UNTIL GONE TABLET, FILM COATED ORAL
Qty: 21 EACH | Refills: 0 | Status: ON HOLD | COMMUNITY

## 2024-10-18 RX ORDER — LEVOTHYROXINE SODIUM 112 UG/1
1 CAPSULE IN THE MORNING ON AN EMPTY STOMACH CAPSULE ORAL
Status: ACTIVE | COMMUNITY

## 2024-10-18 NOTE — HPI-TODAY'S VISIT:
LV 7/2023. Eval historically for dysphagia. Hx of lung cancer, as well as hepatitis C, which was treated to virologic remission.  EGD was done on June 2022 which demonstrated a 3 cm hiatal hernia, 2 tongues of salmon-colored mucosa with biopsies confirming intestinal metaplasia without any dysplasia and otherwise negative for eosinophilic esophagitis and H. pylori.  On his EGD, he did have scattered retained fluid and air bubbles in the middle third of the esophagus and lower third of the esophagus suggestive of possible dysmotility.  He had an aspiration study which did demonstrate aspiration of thin liquids and residue and he also had a swallow study with speech pathology done in April 2022 which demonstrated severe pharyngeal phase dysphagia mainly characterized by decreased airway closure which resulted in aspiration of thin and nectar thick liquids.  He was deemed to be at continued high risk for aspiration. 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 Swallowing difficulties largely oropharyngeal in nature, as evidenced by swallow evaluation in 4/2022, and he requires dysphagia therapy. He understands that he continues to be high risk for aspiration and lung disease and pneumonia, and possible even death as a result. On PPI for his Staples's and plan was to repeat EGD as needed, and in 3 years for surveillance. He did experience a intrajugular DVT back in late 2022 and has had progressive oropharyngeal discomfort and dysphagia with worsening fatigue.  Weight in May was 131. Lab values in May 2023 demonstrated hemoglobin of 8.7, platelets 189, creatinine 1.0, normal LFTs.  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  The plan for his treatment was proceed with definitive chemoradiotherapy.  On Eliquis for his DVT of the internal jugular which was thought to be provoked by his new diagnosis of cancer in the neck.  He also did have a stent placement to his coronary arteries in September 2022 and had been on Plavix and aspirin. ChemoXRT stopped at the beginning of May 2023. He had eval at ENT and mass lesion is not seen any further. He was hoarse. Had Gtube placed by IR on 6/27/23. Did have aspiration on X-ray at the start of June 2023. He continued to eat via PO intake despite aspiration risk. LV was having pain at site of Gtube insertion, continuous, and wanted tube out. Has lost 8 lbs since last visit, so not a ton. Has not been using tube for feeds. Has flushed the tube. Had some milky fluid inside. LV in July 2023 he was seen for gastrostomy complication (unrelenting pain at g-tube insertion site, which was placed on 6/27/23) with erythema at insertion site and drainage of possible purulent fluid. Risks of removal at this time (earlier than 4 weeks and possible immature tract) were discussed with patient and decision was made to proceed. Balloon was deflated and tube was removed without issue. Stoma was draped with gauze and patient did well. Will treat with antibiotics for 10 days (Augmentin) in case of any peritoneal spill and did advise that if he develops severe, persistent abdomen pain, that he proceed to ER given concern for peritonitis in that situation. He did well post-G tube removal. Was still following with [facility] with plan PET/CT and Hgb in 7/2023 was 11.4. LV weight 114. He was eating PO, solids, sometimes coughs, but no clear choking/aspiration. He is getting post-prandial abd discomfort, sometimes positional, when bending down. No vomiting. He has lost 9 lbs since last visit. Just not eating enough. It feels like a muscle spasm at times. He remains on Plavix, still on Eliquis. Plan last visit was to get his last PET CT scan from [facility] which she had done at the end of July 2022, but overall did not seem that he was getting enough calories some of which may be secondary to swallow issues but some of which also due to his treatment effects.  Plan was also to pursue an upper endoscopy with dilation and cardiac clearance.  EGD November 2023 demonstrated a small 2 cm sliding hiatal hernia, 2 tongues of salmon-colored mucosa consistent with Staples's esophagus, otherwise normal esophagus with dilation performed to 51 French, healthy-appearing post gastrostomy scar in the gastric body, erythema in the gastric body, normal duodenum.  He was placed back on PPI for his Staples's esophagus given that he was not taking this and to repeat an upper endoscopy for dilation as needed. Echocardiogram from April 2022 demonstrated an EF of 45 to 50%.  PET CT scan demonstrated that the previously seen asymmetric mass involving the left hypopharynx had resolved, multiple parenchymal opacities in the right and left lungs which could be infectious or inflammatory, no FDG uptake in the bowel, no FDG avid retroperitoneal mesenteric lymphadenopathy.  Close follow-up was recommended as metastatic disease could not be ruled out. Placed back on PPI. Off Plavix, he is aspirin, off Eliquis. Seeing pulm doctor 12/5/23. Weight is stable since last visit. He does follow with [facility]. Drinking Boost shakes daily. At , his weight was stable, and EGD/PET scan was largely normal, and followed by [facility] as well as pulmonary.  Labs were recently in October 2024 demonstrated a white count of 15.7, hemoglobin 8.8 with an MCV of 86, platelets 301, INR 1.1, sodium 130, creatinine 0.88, normal LFTs with a BNP of 2500. He is on oxygen all the time now. He is having issues with eating, epigastric pain, early fulllness. He is only on pantoprazole 40 mg daily. Down 7 lbs since last visit. Post-prandial pain for hours. Goes to have BMs, usually daily, no bleeding. No blood thinners. Still has violent coughing at times, and still takes PO intake, despite knowing that he is at risk for aspiration.

## 2024-11-07 ENCOUNTER — LAB OUTSIDE AN ENCOUNTER (OUTPATIENT)
Dept: URBAN - METROPOLITAN AREA CLINIC 7 | Facility: CLINIC | Age: 74
End: 2024-11-07

## 2024-11-12 ENCOUNTER — P2P PATIENT RECORD (OUTPATIENT)
Age: 74
End: 2024-11-12

## 2024-12-28 ENCOUNTER — WEB ENCOUNTER (OUTPATIENT)
Dept: URBAN - METROPOLITAN AREA CLINIC 7 | Facility: CLINIC | Age: 74
End: 2024-12-28

## 2024-12-28 RX ORDER — ESOMEPRAZOLE MAGNESIUM 40 MG/1
OPEN 1 CAPSULE AND MIX WITH 50 ML WATER AND ADMINISTER VIA SYRINGE INTO G-TUBE CAPSULE, DELAYED RELEASE PELLETS ORAL
Qty: 30 | Refills: 11 | OUTPATIENT
Start: 2024-12-31

## 2025-02-19 ENCOUNTER — TELEPHONE ENCOUNTER (OUTPATIENT)
Dept: URBAN - METROPOLITAN AREA CLINIC 7 | Facility: CLINIC | Age: 75
End: 2025-02-19

## 2025-03-11 ENCOUNTER — WEB ENCOUNTER (OUTPATIENT)
Dept: URBAN - METROPOLITAN AREA CLINIC 7 | Facility: CLINIC | Age: 75
End: 2025-03-11

## 2025-03-11 RX ORDER — FAMOTIDINE 40 MG/1
1 TABLET TABLET, FILM COATED ORAL
Qty: 180 | Refills: 2
Start: 2024-10-18

## 2025-03-28 ENCOUNTER — WEB ENCOUNTER (OUTPATIENT)
Dept: URBAN - METROPOLITAN AREA CLINIC 7 | Facility: CLINIC | Age: 75
End: 2025-03-28

## 2025-05-21 ENCOUNTER — OFFICE VISIT (OUTPATIENT)
Dept: URBAN - METROPOLITAN AREA CLINIC 7 | Facility: CLINIC | Age: 75
End: 2025-05-21